# Patient Record
Sex: FEMALE | Employment: UNEMPLOYED | URBAN - METROPOLITAN AREA
[De-identification: names, ages, dates, MRNs, and addresses within clinical notes are randomized per-mention and may not be internally consistent; named-entity substitution may affect disease eponyms.]

---

## 2019-08-09 ENCOUNTER — CLINICAL SUPPORT (OUTPATIENT)
Dept: CARDIOLOGY | Facility: CLINIC | Age: 64
End: 2019-08-09
Attending: FAMILY MEDICINE
Payer: MEDICARE

## 2019-08-09 DIAGNOSIS — R00.2 PALPITATIONS: ICD-10-CM

## 2019-08-09 PROCEDURE — 93224 XTRNL ECG REC UP TO 48 HRS: CPT | Mod: S$GLB,,, | Performed by: INTERNAL MEDICINE

## 2019-08-09 PROCEDURE — 93224 HOLTER MONITOR - 48 HOUR (CUPID ONLY): ICD-10-PCS | Mod: S$GLB,,, | Performed by: INTERNAL MEDICINE

## 2019-08-13 LAB
OHS CV EVENT MONITOR DAY: 0
OHS CV HOLTER LENGTH DECIMAL HOURS: 48
OHS CV HOLTER LENGTH HOURS: 48
OHS CV HOLTER LENGTH MINUTES: 0

## 2019-09-11 ENCOUNTER — TELEPHONE (OUTPATIENT)
Dept: CARDIOLOGY | Facility: CLINIC | Age: 64
End: 2019-09-11

## 2019-09-11 NOTE — TELEPHONE ENCOUNTER
----- Message from Julianne Pacheco sent at 9/11/2019  1:18 PM CDT -----  Contact: Rocio dorantes/ Dr. Rodríguez  No. [phone] Rocio called to schedule a NP appointment in [facility] for this patient due to heart palpitations.

## 2020-07-17 ENCOUNTER — HOSPITAL ENCOUNTER (EMERGENCY)
Facility: HOSPITAL | Age: 65
Discharge: HOME | End: 2020-07-17
Attending: FAMILY MEDICINE
Payer: MEDICARE

## 2020-07-17 VITALS
SYSTOLIC BLOOD PRESSURE: 122 MMHG | HEIGHT: 65 IN | DIASTOLIC BLOOD PRESSURE: 74 MMHG | TEMPERATURE: 98 F | WEIGHT: 121 LBS | OXYGEN SATURATION: 100 % | BODY MASS INDEX: 20.16 KG/M2 | HEART RATE: 74 BPM | RESPIRATION RATE: 18 BRPM

## 2020-07-17 DIAGNOSIS — S69.90XA FISH HOOK INJURY OF FINGER: ICD-10-CM

## 2020-07-17 DIAGNOSIS — S69.92XA FISH HOOK INJURY OF FINGER OF LEFT HAND, INITIAL ENCOUNTER: Primary | ICD-10-CM

## 2020-07-17 PROCEDURE — 99283 EMERGENCY DEPT VISIT LOW MDM: CPT | Mod: 25,ER

## 2020-07-17 RX ORDER — PRAVASTATIN SODIUM 40 MG/1
TABLET ORAL
COMMUNITY

## 2020-07-17 RX ORDER — MIRABEGRON 25 MG/1
TABLET, FILM COATED, EXTENDED RELEASE ORAL
COMMUNITY

## 2020-07-17 RX ORDER — DOXYCYCLINE 100 MG/1
100 CAPSULE ORAL 2 TIMES DAILY
Qty: 20 CAPSULE | Refills: 0 | Status: SHIPPED | OUTPATIENT
Start: 2020-07-17 | End: 2020-07-27

## 2020-07-17 RX ORDER — LIDOCAINE HYDROCHLORIDE 10 MG/ML
10 INJECTION, SOLUTION EPIDURAL; INFILTRATION; INTRACAUDAL; PERINEURAL
Status: DISCONTINUED | OUTPATIENT
Start: 2020-07-17 | End: 2020-07-17 | Stop reason: HOSPADM

## 2020-07-17 RX ORDER — SILVER SULFADIAZINE 10 G/1000G
CREAM TOPICAL
COMMUNITY

## 2020-07-17 RX ORDER — SULFAMETHOXAZOLE AND TRIMETHOPRIM 800; 160 MG/1; MG/1
TABLET ORAL
COMMUNITY

## 2020-07-17 RX ORDER — LISINOPRIL AND HYDROCHLOROTHIAZIDE 12.5; 2 MG/1; MG/1
TABLET ORAL
COMMUNITY

## 2020-07-17 RX ORDER — LEVOTHYROXINE SODIUM 25 UG/1
TABLET ORAL
COMMUNITY

## 2020-07-18 NOTE — DISCHARGE INSTRUCTIONS
You are instructed to follow-up with your primary care provider for re-evaluation within 3 days.  You are instructed to return to the emergency department immediately for any new or worsening symptoms.

## 2020-07-18 NOTE — ED PROVIDER NOTES
Encounter Date: 7/17/2020       History     Chief Complaint   Patient presents with    Foreign Body in Skin     1 hr PTA arrival pt was trying to untangle fish pole string and fish hook caught on to her left middle finger. No bleeding noted. Fish hook does not go through bone. Appears to be superficial.      64-year-old female presents to the emergency department for evaluation of of a fishhook imbedded in her left 3rd finger.  She reports that she was attempting to on tingle some fishing pull string approximately 1 hr prior to arrival when the hook became imbedded in the skin of her left third finger.  She denies any numbness, tingling, weakness or swelling to the upper extremities.  She reports that her tetanus immunization is up-to-date.  No treatment was attempted prior to arrival.  She denies any headache, dizziness, vision changes, no neck pain, chest pain, abdominal pain, nausea, vomiting or diarrhea.  She reports that she would like an x-ray to check to see if the hook hit the bone.        Review of patient's allergies indicates:  No Known Allergies  Past Medical History:   Diagnosis Date    Hyperlipemia     Hypertension      Past Surgical History:   Procedure Laterality Date    CHOLECYSTECTOMY      EYE SURGERY      HYSTERECTOMY       History reviewed. No pertinent family history.  Social History     Tobacco Use    Smoking status: Never Smoker    Smokeless tobacco: Never Used   Substance Use Topics    Alcohol use: Never     Frequency: Never    Drug use: Not on file     Review of Systems   Constitutional: Negative for activity change, appetite change and fever.   HENT: Negative for congestion, rhinorrhea, sinus pressure, sore throat, trouble swallowing and voice change.    Eyes: Negative for photophobia and visual disturbance.   Respiratory: Negative for cough, chest tightness, shortness of breath and wheezing.    Cardiovascular: Negative for chest pain and leg swelling.   Gastrointestinal:  Negative for abdominal pain, constipation, diarrhea, nausea and vomiting.   Genitourinary: Negative for decreased urine volume, dysuria and flank pain.   Musculoskeletal: Negative for back pain, joint swelling and neck pain.   Skin: Positive for wound. Negative for rash.   Neurological: Negative for dizziness, syncope and headaches.       Physical Exam     Initial Vitals   BP Pulse Resp Temp SpO2   07/17/20 2035 07/17/20 1904 07/17/20 1904 07/17/20 1904 07/17/20 1904   122/74 73 18 98.2 °F (36.8 °C) 98 %      MAP       --                Physical Exam    Nursing note and vitals reviewed.  Constitutional: She appears well-developed and well-nourished. She is not diaphoretic. No distress.   HENT:   Head: Normocephalic and atraumatic.   Right Ear: External ear normal.   Left Ear: External ear normal.   Nose: Nose normal.   Mouth/Throat: Oropharynx is clear and moist.   Eyes: Conjunctivae and EOM are normal. Pupils are equal, round, and reactive to light.   Neck: Normal range of motion. Neck supple.   Cardiovascular: Normal rate, regular rhythm and normal heart sounds.   Pulmonary/Chest: Breath sounds normal. No respiratory distress. She has no wheezes. She has no rhonchi. She has no rales. She exhibits no tenderness.   Abdominal: Soft. Bowel sounds are normal. She exhibits no distension. There is no abdominal tenderness. There is no rebound.   Musculoskeletal:      Left wrist: She exhibits normal range of motion, no tenderness and no bony tenderness.        Left hand: She exhibits tenderness. She exhibits normal range of motion, no bony tenderness, normal capillary refill and no swelling. Normal sensation noted. Normal strength noted.        Hands:    Lymphadenopathy:     She has no cervical adenopathy.   Neurological: She is alert and oriented to person, place, and time.   Skin: Skin is warm and dry.   Psychiatric: She has a normal mood and affect.         ED Course   Procedures  Labs Reviewed - No data to display        Imaging Results          X-Ray Hand 2 View Left (Final result)  Result time 07/17/20 20:14:54    Final result by Santosh Alaniz MD (07/17/20 20:14:54)                 Impression:      As above.      Electronically signed by: Santosh Alaniz MD  Date:    07/17/2020  Time:    20:14             Narrative:    EXAMINATION:  XR HAND 2 VIEW LEFT    CLINICAL HISTORY:  Unspecified injury of unspecified wrist, hand and finger(s), initial encounter    TECHNIQUE:  Routine radiographs obtained.    COMPARISON:  None    FINDINGS:  There is a fish hook embedded in the distal 3rd digit.  It is within the soft tissues without underlying fracture.                                 Medical Decision Making:   Initial Assessment:   64-year-old female presents for evaluation imbedded fishhook into her left 3rd finger.  Physical exam reveals a nontoxic-appearing female in no acute distress.  Patient is afebrile and vital signs within normal limits.  Neurological exam reveals an alert and oriented patient.  No evidence of head injury noted.  Neck is supple, nontender to palpation.  Lungs clear to auscultation bilaterally.  Examination of the left upper extremity reveals [place] imbedded to the radial aspect of the mid left 3rd finger.  No bleeding noted.  Full range of motion, sensation and peripheral pulses intact in upper extremities bilaterally.  Differential Diagnosis:   Imbedded fishhook   X-ray ordered to assess possible osseous injury   ED Management:  While the x-ray was attending patient removed the hook from her own finger while in the emergency department.  She reports that she decided that she wanted to remove it herself without medication.  X-ray report reveals an imbedded fishhook in the distal 3rd digit.  Within the soft tissue without underlying fracture.  These findings were discussed with the patient verbalizes understanding and agreement course of treatment.  Instructed the patient to follow up with her primary care  provider for re-evaluation and to return to the emergency department immediately for any new or worsening symptoms.                                  Clinical Impression:       ICD-10-CM ICD-9-CM   1. Fish hook injury of finger of left hand, initial encounter  S69.92XA 959.5   2. Fish hook injury of finger  S69.90XA 959.5             ED Disposition Condition    Discharge Stable        ED Prescriptions     Medication Sig Dispense Start Date End Date Auth. Provider    doxycycline (VIBRAMYCIN) 100 MG Cap Take 1 capsule (100 mg total) by mouth 2 (two) times daily. for 10 days 20 capsule 7/17/2020 7/27/2020 Tomasa Sampson PA-C        Follow-up Information    None                                    Tomasa Sampson PA-C  07/18/20 1136

## 2020-12-20 ENCOUNTER — HOSPITAL ENCOUNTER (EMERGENCY)
Facility: HOSPITAL | Age: 65
Discharge: HOME | End: 2020-12-20
Attending: FAMILY MEDICINE
Payer: MEDICARE

## 2020-12-20 VITALS
HEIGHT: 64 IN | TEMPERATURE: 98 F | HEART RATE: 92 BPM | BODY MASS INDEX: 20.49 KG/M2 | DIASTOLIC BLOOD PRESSURE: 83 MMHG | RESPIRATION RATE: 18 BRPM | SYSTOLIC BLOOD PRESSURE: 112 MMHG | OXYGEN SATURATION: 99 % | WEIGHT: 120 LBS

## 2020-12-20 DIAGNOSIS — U07.1 COVID-19 VIRUS DETECTED: ICD-10-CM

## 2020-12-20 DIAGNOSIS — U07.1 UPPER RESPIRATORY TRACT INFECTION DUE TO COVID-19 VIRUS: Primary | ICD-10-CM

## 2020-12-20 DIAGNOSIS — J06.9 UPPER RESPIRATORY TRACT INFECTION DUE TO COVID-19 VIRUS: Primary | ICD-10-CM

## 2020-12-20 LAB
GROUP A STREP, MOLECULAR: NEGATIVE
INFLUENZA A, MOLECULAR: NEGATIVE
INFLUENZA B, MOLECULAR: NEGATIVE
SARS-COV-2 RDRP RESP QL NAA+PROBE: POSITIVE
SPECIMEN SOURCE: NORMAL

## 2020-12-20 PROCEDURE — 87502 INFLUENZA DNA AMP PROBE: CPT | Mod: ER

## 2020-12-20 PROCEDURE — 99284 EMERGENCY DEPT VISIT MOD MDM: CPT | Mod: 25,ER

## 2020-12-20 PROCEDURE — 63700000 PHARM REV CODE 250 ALT 637 W/O HCPCS: Mod: ER | Performed by: FAMILY MEDICINE

## 2020-12-20 PROCEDURE — U0002 COVID-19 LAB TEST NON-CDC: HCPCS | Mod: ER

## 2020-12-20 PROCEDURE — 87651 STREP A DNA AMP PROBE: CPT | Mod: ER

## 2020-12-20 PROCEDURE — 25000003 PHARM REV CODE 250: Mod: ER | Performed by: FAMILY MEDICINE

## 2020-12-20 RX ORDER — BENZONATATE 100 MG/1
200 CAPSULE ORAL
Status: COMPLETED | OUTPATIENT
Start: 2020-12-20 | End: 2020-12-20

## 2020-12-20 RX ORDER — AZITHROMYCIN 250 MG/1
250 TABLET, FILM COATED ORAL DAILY
Qty: 4 TABLET | Refills: 0 | Status: SHIPPED | OUTPATIENT
Start: 2020-12-20

## 2020-12-20 RX ORDER — AZITHROMYCIN 250 MG/1
500 TABLET, FILM COATED ORAL
Status: COMPLETED | OUTPATIENT
Start: 2020-12-20 | End: 2020-12-20

## 2020-12-20 RX ORDER — BENZONATATE 200 MG/1
200 CAPSULE ORAL 3 TIMES DAILY PRN
Qty: 30 CAPSULE | Refills: 0 | Status: SHIPPED | OUTPATIENT
Start: 2020-12-20 | End: 2020-12-30

## 2020-12-20 RX ADMIN — AZITHROMYCIN MONOHYDRATE 500 MG: 250 TABLET ORAL at 11:12

## 2020-12-20 RX ADMIN — BENZONATATE 200 MG: 100 CAPSULE ORAL at 11:12

## 2020-12-20 NOTE — Clinical Note
"Tamanna"Rebecca Juan was seen and treated in our emergency department on 12/20/2020.     COVID-19 is present in our communities across the state. There is limited testing for COVID at this time, so not all patients can be tested. In this situation, your employee meets the following criteria:    Tamanna Juan has met the criteria for COVID-19 testing and has a POSITIVE result. She can return to work once they are asymptomatic for 72 hours without the use of fever reducing medications AND at least ten days from the first positive result.     If you have any questions or concerns, or if I can be of further assistance, please do not hesitate to contact me.    Sincerely,             MADELYN White RN"

## 2020-12-20 NOTE — Clinical Note
"Tamanna "Tamanna" Walker was seen and treated in our emergency department on 12/20/2020.  She may return to work on 12/28/2020.       If you have any questions or concerns, please don't hesitate to call.      Brad Ogden MD"

## 2020-12-20 NOTE — ED PROVIDER NOTES
Encounter Date: 12/20/2020       History     Chief Complaint   Patient presents with    Cough     Pt with c/o productive cough of clear sputum x1 week.      65-year-old female presents to ER with cough and congestion for last 1 week.  She also has mild sore throat.  Mild low-grade fever.  She has been remotely exposed to COVID.  No respiratory distress.  Minimal phlegm with cough.  No abdominal pain, nausea or vomiting.  No chest pain.  She took Tylenol for fever.    The history is provided by the patient.     Review of patient's allergies indicates:  No Known Allergies  Past Medical History:   Diagnosis Date    Hyperlipemia     Hypertension      Past Surgical History:   Procedure Laterality Date    CHOLECYSTECTOMY      EYE SURGERY      HYSTERECTOMY       History reviewed. No pertinent family history.  Social History     Tobacco Use    Smoking status: Never Smoker    Smokeless tobacco: Never Used   Substance Use Topics    Alcohol use: Never     Frequency: Never    Drug use: Not on file     Review of Systems   Constitutional: Positive for fever. Negative for activity change, appetite change and chills.   HENT: Positive for congestion and sore throat. Negative for drooling, ear discharge, ear pain, mouth sores, nosebleeds, rhinorrhea, sinus pressure, sinus pain and trouble swallowing.    Eyes: Negative for photophobia, pain, discharge, redness, itching and visual disturbance.   Respiratory: Positive for cough. Negative for choking, chest tightness, shortness of breath and wheezing.    Cardiovascular: Negative for chest pain, palpitations and leg swelling.   Gastrointestinal: Negative for abdominal distention, abdominal pain, blood in stool, constipation, diarrhea, nausea and vomiting.   Genitourinary: Negative for dysuria, flank pain, frequency and hematuria.   Musculoskeletal: Negative for back pain, gait problem, neck pain and neck stiffness.   Skin: Negative for color change, pallor, rash and wound.         No rash, no erythema,no bruises, no pallor,    Neurological: Negative for dizziness, tremors, seizures, syncope, speech difficulty, weakness, light-headedness, numbness and headaches.   Psychiatric/Behavioral: Negative for behavioral problems, confusion, hallucinations and sleep disturbance. The patient is not nervous/anxious.    All other systems reviewed and are negative.      Physical Exam     Initial Vitals [12/20/20 1035]   BP Pulse Resp Temp SpO2   112/83 92 18 98.2 °F (36.8 °C) 99 %      MAP       --         Physical Exam    Nursing note and vitals reviewed.  Constitutional: Vital signs are normal. She appears well-developed and well-nourished. She is not diaphoretic. She is active. No distress.   HENT:   Head: Normocephalic and atraumatic.   Right Ear: Tympanic membrane normal.   Left Ear: Tympanic membrane normal.   Nose: Nose normal.   Mouth/Throat: Oropharynx is clear and moist.   Eyes: Conjunctivae, EOM and lids are normal. Pupils are equal, round, and reactive to light.   Neck: Trachea normal, normal range of motion and full passive range of motion without pain. Neck supple. Normal range of motion present. No neck rigidity.   Cardiovascular: Normal rate, regular rhythm, S1 normal, S2 normal, normal heart sounds, intact distal pulses and normal pulses.   Pulmonary/Chest: No respiratory distress. She has wheezes. She has no rhonchi. She has no rales. She exhibits no tenderness.   Abdominal: Soft. Normal appearance and bowel sounds are normal. She exhibits no distension. There is no abdominal tenderness. There is no rebound.   Musculoskeletal: Normal range of motion. No tenderness or edema.   Lymphadenopathy:     She has no cervical adenopathy.   Neurological: She is alert and oriented to person, place, and time. She has normal strength and normal reflexes. No cranial nerve deficit or sensory deficit. GCS score is 15. GCS eye subscore is 4. GCS verbal subscore is 5. GCS motor subscore is 6.   Skin: Skin  is warm and intact. Capillary refill takes less than 2 seconds. No abrasion, no bruising and no rash noted.   Psychiatric: She has a normal mood and affect. Her speech is normal and behavior is normal. Judgment and thought content normal. She is not actively hallucinating. Cognition and memory are normal. She is attentive.         ED Course   Procedures  Labs Reviewed   SARS-COV-2 RNA AMPLIFICATION, QUAL - Abnormal; Notable for the following components:       Result Value    SARS-CoV-2 RNA, Amplification, Qual Positive (*)     All other components within normal limits   INFLUENZA A & B BY MOLECULAR   GROUP A STREP, MOLECULAR          Imaging Results          X-Ray Chest AP Portable (Final result)  Result time 12/20/20 11:38:30    Final result by Vasquez Peguero MD (12/20/20 11:38:30)                 Impression:      1.  Negative for acute process involving the chest.    2.  Incidental findings as noted above.  This includes multiple soft tissue nodules diffusely overlying the soft tissues.  Does the patient have history of neurofibromatosis?      Electronically signed by: Vasquez Peguero MD  Date:    12/20/2020  Time:    11:38             Narrative:    EXAMINATION:  XR CHEST AP PORTABLE    CLINICAL HISTORY:  cough;    COMPARISON:  No comparison studies are available.    FINDINGS:  The patient has too numerous to count soft tissue nodules overlying the chest, upper abdomen, neck and arms.  Some of these opacities overlie the pulmonary parenchyma.  The lungs are free of alveolar opacities.  The cardiac silhouette size is normal. The trachea is midline and the mediastinal width is normal. Negative for focal infiltrate, effusion or pneumothorax. Pulmonary vasculature is normal. Negative for osseous abnormalities. Apical pleuroparenchymal changes.  Convex-left curvature of the lower thoracic spine with marginal spondylosis.  Ectatic and tortuous aorta with calcifications of the aortic knob.  Eventration of the  hemidiaphragms.  Cardiophrenic fat pads.                                 Medical Decision Making:   Initial Assessment:   URI symptoms with possible COVID exposure.  Differential Diagnosis:   URI, pneumonia, COVID.  Clinical Tests:   Lab Tests: Ordered and Reviewed  Radiological Study: Ordered and Reviewed  ED Management:  Positive for COVID.  Chest x-ray normal.  Patient has been referred for outpatient antibody therapy after discussion and information given.  Outpatient referral has been placed.  Home quadrant and until total of 14 days.  Patient is given excuse for another 7 days.  She is advised to follow up ER immediately with any worsening symptoms.                             Clinical Impression:     ICD-10-CM ICD-9-CM   1. Upper respiratory tract infection due to COVID-19 virus  U07.1 465.9    J06.9 079.89                      Disposition:   Disposition: Discharged  Condition: Stable     ED Disposition Condition    Discharge Stable        ED Prescriptions     Medication Sig Dispense Start Date End Date Auth. Provider    azithromycin (Z-AKIN) 250 MG tablet Take 1 tablet (250 mg total) by mouth once daily. 4 tablet 12/20/2020  Brad Ogden MD    benzonatate (TESSALON) 200 MG capsule Take 1 capsule (200 mg total) by mouth 3 (three) times daily as needed. 30 capsule 12/20/2020 12/30/2020 Brad Ogden MD        Follow-up Information    None                                      Brad Ogden MD  12/20/20 6001